# Patient Record
Sex: FEMALE | Employment: OTHER | URBAN - METROPOLITAN AREA
[De-identification: names, ages, dates, MRNs, and addresses within clinical notes are randomized per-mention and may not be internally consistent; named-entity substitution may affect disease eponyms.]

---

## 2023-10-05 ENCOUNTER — MEDICAL CORRESPONDENCE (OUTPATIENT)
Dept: HEALTH INFORMATION MANAGEMENT | Facility: CLINIC | Age: 68
End: 2023-10-05
Payer: COMMERCIAL

## 2023-10-06 ENCOUNTER — TRANSCRIBE ORDERS (OUTPATIENT)
Dept: OTHER | Age: 68
End: 2023-10-06

## 2023-10-06 DIAGNOSIS — J31.2 CHRONIC SORE THROAT: Primary | ICD-10-CM

## 2023-10-30 ENCOUNTER — TELEPHONE (OUTPATIENT)
Dept: OTOLARYNGOLOGY | Facility: CLINIC | Age: 68
End: 2023-10-30
Payer: COMMERCIAL

## 2023-10-30 NOTE — TELEPHONE ENCOUNTER
Called patient to let her know that we did not have any openings right now, but would let her know if we have anything open up last minute. Patient was agreeable and verbalized understanding of the situation. Sandra Guerrier RN on 10/30/2023 at 2:12 PM

## 2023-10-30 NOTE — TELEPHONE ENCOUNTER
[facility] Call [facility]    Phone Message    May a detailed message be left on voicemail: yes     Reason for Call: Other: Pt will be in town for a conference and is available on 11/6; 11/9; and 11/10 if there is an opening while she is in town.  OSMEL mello Thanks     Action Taken: Other: ENT    Travel Screening: Not Applicable

## 2023-11-11 ENCOUNTER — HEALTH MAINTENANCE LETTER (OUTPATIENT)
Age: 68
End: 2023-11-11

## 2023-12-04 ENCOUNTER — TELEPHONE (OUTPATIENT)
Dept: OTOLARYNGOLOGY | Facility: CLINIC | Age: 68
End: 2023-12-04
Payer: COMMERCIAL

## 2023-12-04 NOTE — TELEPHONE ENCOUNTER
Called patient to go over new symptoms. Per patient when her cat goes on her shoulder it makes her throat hurt more than usual. Patient reports having a reactive airway, writer suggested patient meet with community provider to be scoped. Patient was agreeable and verbalized understanding of the situation. Patient will reach out with questions or concerns in the meantime. Sandra Guerrier RN on 12/4/2023 at 3:40 PM

## 2023-12-04 NOTE — TELEPHONE ENCOUNTER
[facility] Call Center    Phone Message    May a detailed message be left on voicemail: yes     Reason for Call: Other: per patient having new symptoms feels like airway being affected. Throat is being effected please reach out to patient to help      Action Taken: Other: ENT    Travel Screening: Not Applicable

## 2024-01-12 NOTE — TELEPHONE ENCOUNTER
"Bety is calling today thinking that she has an appointment set up for a procedure with Dr De Leon in [place], on Monday, Jan 22, 2024 @ 10:00 am. I'm not able to see where this has been set or that she has even had a consult with him. I did check and was not able to find another MRN for her that might have had the information; this is the only chart I located.    She states the nurse she spoke with in December gave her all the specific information about having this procedure done. 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     Bety says she lives in [place] but that \"healthcare sucks\" there and was exploring all her options. She was initially from [place] so opted to seek care here. Her plan was to fly in on Saturday, 1/20 so she would be here in the event of bad weather.     She doesn't understand how everything is \"so messed up.\" Is there anyone that can reach out to her? Bety understands it might not be today but would appreciate a call as soon as possible.     She can be reached at [phone] (cell) and it is okay to leave a detailed message. Thank you.   "

## 2024-01-15 ENCOUNTER — TELEPHONE (OUTPATIENT)
Dept: SURGERY | Facility: CLINIC | Age: 69
End: 2024-01-15
Payer: COMMERCIAL

## 2024-01-15 NOTE — TELEPHONE ENCOUNTER
Spoke to patient, didn't schedule an appointment with Dr De Leon, patient requested to speak to an RN, transferred call to Anh

## 2024-01-15 NOTE — TELEPHONE ENCOUNTER
Patient called today stating she had a procedure scheduled with Dr. De Leon this week, there is no record of an appointment. Writer did update patient that we would never schedule for a procedure without the patient being seen first. Appears patient does have an appt at the [facility] in April. Writer explained that Dr. De Leon is booked out until March, writer offered to schedule appt for that time. Patient refused. Writer gave patient scheduling number for [place] at the [facility].    Anh Farias RN on 1/15/2024 at 1:57 PM

## 2024-01-15 NOTE — TELEPHONE ENCOUNTER
This patient has never seen Dr. De Leon, we have no record of any appointment here at [facility] with him. Please call patient and help schedule first available with Dr. De Leon for chronic sore throat. Patient should reach out to PCP for any issues in the mean time.     Anh Farias RN on 1/15/2024 at 12:19 PM

## 2024-02-13 NOTE — TELEPHONE ENCOUNTER
FUTURE VISIT INFORMATION      FUTURE VISIT INFORMATION:  Date: 4/22/24  Time: 7:30am  [place] [facility]  [place]ERRAL INFORMATION:  Referring provider:  Shalonda Lazaro   Referring providers clinic:  [facility]   Reason for visit/diagnosis  Chronic sore throat. Ref by Shalonda Lazaro. [facility] verified. [facility]     RECORDS REQUESTED FROM:       Clinic name Comments Records Status Imaging Status   [facility]   10/5/23, 6/27/23, 3/8/23 - OV/ REFERRAL Shalonda Lazaro  Scanned In & CE    [facility]  1/13/24-ED    Images:  7/5/23- US Thyroid   12/16/22- CT Head  CE Pending req

## 2024-02-26 ENCOUNTER — TELEPHONE (OUTPATIENT)
Dept: OTOLARYNGOLOGY | Facility: CLINIC | Age: 69
End: 2024-02-26
Payer: COMMERCIAL

## 2024-04-22 ENCOUNTER — PRE VISIT (OUTPATIENT)
Dept: OTOLARYNGOLOGY | Facility: CLINIC | Age: 69
End: 2024-04-22